# Patient Record
Sex: MALE | Race: WHITE | NOT HISPANIC OR LATINO | Employment: UNEMPLOYED | URBAN - METROPOLITAN AREA
[De-identification: names, ages, dates, MRNs, and addresses within clinical notes are randomized per-mention and may not be internally consistent; named-entity substitution may affect disease eponyms.]

---
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
## 2024-01-10 ENCOUNTER — APPOINTMENT (OUTPATIENT)
Dept: PRIMARY CARE | Facility: CLINIC | Age: 19
End: 2024-01-10
Payer: COMMERCIAL

## 2024-01-17 ENCOUNTER — OFFICE VISIT (OUTPATIENT)
Dept: PRIMARY CARE | Facility: CLINIC | Age: 19
End: 2024-01-17
Payer: COMMERCIAL

## 2024-01-17 VITALS
HEIGHT: 74 IN | BODY MASS INDEX: 39.78 KG/M2 | WEIGHT: 310 LBS | RESPIRATION RATE: 18 BRPM | OXYGEN SATURATION: 99 % | DIASTOLIC BLOOD PRESSURE: 84 MMHG | SYSTOLIC BLOOD PRESSURE: 122 MMHG | HEART RATE: 97 BPM

## 2024-01-17 DIAGNOSIS — F32.2 MODERATELY SEVERE MAJOR DEPRESSION (MULTI): ICD-10-CM

## 2024-01-17 PROCEDURE — 99213 OFFICE O/P EST LOW 20 MIN: CPT | Performed by: FAMILY MEDICINE

## 2024-01-17 RX ORDER — BUPROPION HYDROCHLORIDE 300 MG/1
300 TABLET ORAL EVERY MORNING
Qty: 30 TABLET | Refills: 5 | Status: SHIPPED | OUTPATIENT
Start: 2024-01-17 | End: 2024-04-12 | Stop reason: SDUPTHER

## 2024-01-17 NOTE — PROGRESS NOTES
Subjective   Patient ID: Omar Cage is a 18 y.o. male who presents for Follow-up.  HPI  Still some abd tenderness.  Miralx helped.  Still having   Elevated LFT's      Review of Systems    Objective   Physical Exam  General: Patient is alert and oriented ×3 and appears in no acute distress. No respiratory distress.    Head: Atraumatic normocephalic.    Eyes: EOMI, PERRLA      Ears: Canals patent without any irritation, tympanic membranes without inflammation, no swelling, normal light reflex.    Nose: Nares patent. Turbinates are not swollen. No discharge.    Mouth: Normal mucosa. Moist. No erythema, exudates, tonsillar enlargement.    Neck: Normal range of motion, no masses.  Thyroid is palpable and normal in size without any nodules. No anterior cervical or posterior cervical adenopathy.    Heart: Regular rate and rhythm, no murmurs clicks or gallops    Lungs: Clear to auscultation bilaterally without any rhonchi rales or wheezing, lung sounds heard throughout all lung fields    Abdomen: Soft, nontender, no rigidity, rebound, guarding or organomegaly. Bowel sounds ×4 quadrants.    Musculoskeletal: Normal range of motion, strength is grossly intact in the proximal distal muscles of the upper and lower extremities bilaterally, deep tendon reflexes +2 out of 4 and symmetric bilaterally at the patella, Achilles, biceps, triceps, sensation intact.    Nerves: Cranial nerves II through XII appear grossly intact and without deficit    Skin: Intact, dry, no rashes or erythema    Psych: Normal affect.  Assessment/Plan   Problem List Items Addressed This Visit    None  Depression and anxiety  Increased Wellbutrin  Having Vivid dreams  Seeking counselor    Second shift [employer]

## 2024-03-21 ENCOUNTER — APPOINTMENT (OUTPATIENT)
Dept: PRIMARY CARE | Facility: CLINIC | Age: 19
End: 2024-03-21
Payer: COMMERCIAL

## 2024-04-12 DIAGNOSIS — F32.2 MODERATELY SEVERE MAJOR DEPRESSION (MULTI): ICD-10-CM

## 2024-04-13 RX ORDER — BUPROPION HYDROCHLORIDE 300 MG/1
300 TABLET ORAL EVERY MORNING
Qty: 30 TABLET | Refills: 5 | Status: SHIPPED | OUTPATIENT
Start: 2024-04-13 | End: 2024-04-15 | Stop reason: SDUPTHER

## 2024-04-15 DIAGNOSIS — F32.2 MODERATELY SEVERE MAJOR DEPRESSION (MULTI): ICD-10-CM

## 2024-04-15 RX ORDER — BUPROPION HYDROCHLORIDE 300 MG/1
300 TABLET ORAL EVERY MORNING
Qty: 30 TABLET | Refills: 5 | Status: SHIPPED | OUTPATIENT
Start: 2024-04-15 | End: 2024-10-12

## 2024-05-07 ENCOUNTER — APPOINTMENT (OUTPATIENT)
Dept: PRIMARY CARE | Facility: CLINIC | Age: 19
End: 2024-05-07

## 2025-01-29 DIAGNOSIS — F32.2 MODERATELY SEVERE MAJOR DEPRESSION (MULTI): ICD-10-CM

## 2025-01-29 RX ORDER — BUPROPION HYDROCHLORIDE 300 MG/1
300 TABLET ORAL EVERY MORNING
Qty: 30 TABLET | Refills: 5 | Status: SHIPPED | OUTPATIENT
Start: 2025-01-29 | End: 2025-07-28

## 2025-05-14 ENCOUNTER — APPOINTMENT (OUTPATIENT)
Dept: PRIMARY CARE | Facility: CLINIC | Age: 20
End: 2025-05-14
Payer: COMMERCIAL